# Patient Record
Sex: MALE | Race: WHITE | NOT HISPANIC OR LATINO
[De-identification: names, ages, dates, MRNs, and addresses within clinical notes are randomized per-mention and may not be internally consistent; named-entity substitution may affect disease eponyms.]

---

## 2023-08-01 ENCOUNTER — NON-APPOINTMENT (OUTPATIENT)
Age: 43
End: 2023-08-01

## 2023-08-01 ENCOUNTER — APPOINTMENT (OUTPATIENT)
Dept: PAIN MANAGEMENT | Facility: CLINIC | Age: 43
End: 2023-08-01

## 2023-08-01 PROBLEM — Z00.00 ENCOUNTER FOR PREVENTIVE HEALTH EXAMINATION: Status: ACTIVE | Noted: 2023-08-01

## 2023-08-18 ENCOUNTER — APPOINTMENT (OUTPATIENT)
Dept: PAIN MANAGEMENT | Facility: CLINIC | Age: 43
End: 2023-08-18
Payer: COMMERCIAL

## 2023-08-18 VITALS
HEART RATE: 95 BPM | HEIGHT: 68 IN | SYSTOLIC BLOOD PRESSURE: 145 MMHG | DIASTOLIC BLOOD PRESSURE: 86 MMHG | OXYGEN SATURATION: 98 % | WEIGHT: 173 LBS | BODY MASS INDEX: 26.22 KG/M2

## 2023-08-18 PROCEDURE — 99203 OFFICE O/P NEW LOW 30 MIN: CPT

## 2023-08-18 RX ORDER — BUPROPION HCL 100 MG
100 TABLET,SUSTAINED-RELEASE 12 HR ORAL
Refills: 0 | Status: ACTIVE | COMMUNITY

## 2023-08-18 NOTE — PHYSICAL EXAM
[de-identified] : General Appearance: Level of consciousness: Alert Body habitus: Well-nourished Signs of distress: Some noticeable discomfort. Hygiene: Clean  Psychiatric: ++ Pressured speech, ++ Restless, + Cooperative.  Skin: Multiple Tattoo's  Nailbeds pink with no cyanosis or clubbing. Lesions or rashes: None observed  Head and Neck: The head is normocephalic and atraumatic Eyes: Conjunctivae  clear without exudates. Sclera is non-icteric Ears: No discharge. Hearing is intact with good acuity Nose: No discharge. No nasal flaring Mouth and throat: Trachea Midline, teeth and gums are without obvious lesion  Respiratory: Breathing pattern: Normal Chest movement: Symmetrical Use of accessory muscles: Absent Cough: Absent Wheezing: Absent  Cardiovascular System: Pulse: Regular Cyanosis: Absent  Abdomen: Inspection: No distention Visible pulsations: Absent  Musculoskeletal System: Muscle strength:   strength is normal bilaterally. Gait: Steady, NO assistive device Posture: Upright Rises from a seated position with no significant difficulty    Spine: Curvature of the cervical, thoracic, and lumbar spine are within normal limits. Spinous processes are midline. NO tenderness of spinous processes. LEFT SCM hypertonic Paraspinal musculature is NOT hypertonic NO discomfort is noted with flexion MILD discomfort is noted with extension (cervical spine) No discomfort is noted with side-to-side rotation  Straight leg raise test is negative bilaterally.  Joint examination: No Swelling, No gross deformities.   Neurological System: Cranial nerves 2-12: Grossly intact II-Vision grossly intact, PERRLA, III/IV/VI- EOMI, VII-hearing grossly normal, V-clenches jaw, VII- Raises eyebrows bilaterally; Smile/frown intact and symmetric, capable of barring teeth / puff cheeks, XII- Able to protrude and move tongue side to side, IX/X- swallowing intact; capable of saying Ah Motor function: Moving all extremities against gravity Dorsi/plantar flexion is normal bilaterally. Sensation: No gross deficit to light touch

## 2023-08-18 NOTE — HISTORY OF PRESENT ILLNESS
[Back Pain] : back pain [Neck Pain] : neck pain [___ yrs] : [unfilled] year(s) ago [Constant] : constant [3] : a current pain level of 3/10 [5] : a minimum pain level of 5/10 [10] : a maximum pain level of 10/10 [Dull] : dull [Aching] : aching [Burning] : burning [Bending] : bending [Lifting] : lifting [Turning Head] : turning head [Looking Up] : looking up [Looking Down] : looking down [Laying] : laying [Heat] : heat [Ice] : ice [FreeTextEntry1] : HPI - Mr. MILADYS ROBERTS is a 42 year M with PHx as below, referred by SELF who presents today with chief complaint of neck and right shoulder pain. Reports that it developed many years ago. . It is located cervical spine there is radiation of the pain into the RIGHT shoulder.  The pain is presently 5/10 in severity on the numerical rating scale. It is sharp in nature. The pain is constant. There is diurnal worsening, during the night. The pain is aggravated with overhead activity. The pain improves with rest. The pain is functionally and emotionally disabling for the patient as its preventing them from going about activities of daily living, such as routine housework. The pain does impair the patients ability to sleep.   Patient was NOT  been seen by pain management in the past. Patient attests to  6 weeks of provider directed treatment, including a provider directed stretching regimen, chiropractor Patient reports treatment that occurred within the last 3 months. Patient report that symptoms have been persistent and and progressing after treatment   Reports there is NO present numbness, there is NO weakness. Patient denies any bowel/bladder incontinence, no saddle/perineal anesthesia or any other red flag signs or symptoms. Reports regular BMs.    [FreeTextEntry7] : Patient presents with mid back pain and neck pain.  [FreeTextEntry4] : PT, Acupuncture, , Chiropractor

## 2023-08-18 NOTE — ASSESSMENT
[FreeTextEntry1] : Mr. MILADYS ROBERTS is a 42 year M suffering from neck pain, that based upon today's subjective complaints, physical examination, and imaging review, is likely multifactorial with component of cervical spondylosis as well as myofascial element.  >> Medications  Given the patient's significant anxiety during examination, this condition does not seem to be well controlled. Opiates do not have strong indication in patients with underlying psychiatric disease, especially when psychiatric pathology is active. This condition is known to be associated with increased risk of opiate related aberrant behavior. For these reasons, patient is not a good candidate for this class of medications, and I will not provide.   C/w Cyclobenzarine 10 mg po qhs prn  >> Interventions   None indicated at this time   >> Therapy and Other Modalities   diagnosis: cervical spondylosis  periscapular and scapulothoracic stretching and strengthening teach home exercise regimen massage and myofascial release increase ROM, strengthening, postural training, other modalities ad eugene physical therapy - 2x weekly / 6 weeks Precautions - universal safety, falls   >> Imaging and Other Studies  I have personally reviewed the images in detail together with the patient today, and I have answered all questions regarding this condition to the best of my ability, to the patient's satisfaction.   >> Consults  None ordered

## 2023-08-18 NOTE — REVIEW OF SYSTEMS
[Back Pain] : back pain [Neck Pain] : neck pain [Joint Pain] : joint pain [Joint Stiffness] : joint stiffness [Decreased ROM] : decreased range of motion [Weakness] : weakness [Negative] : Heme/Lymph

## 2023-08-18 NOTE — DATA REVIEWED
[FreeTextEntry1] :   Order#: [id number]; [id number]    CLINICAL HISTORY: Chronic pain.  Cervical Spine.  Frontal, lateral, right and left oblique and open mouth projections demonstrate satisfactory alignment of the bony structures. No fracture or vertebral compression can be appreciated. No prevertebral soft tissue swelling or abnormal splaying of the spinous processes is noted. There is disc space narrowing and associated hypertrophic spurring at the C5-6 and C6-7 levels. The exiting neural foramen are suboptimally profiled, however, no gross foraminal encroachment can be appreciated. No destructive bony lesion is noted.   IMPRESSION: Degenerative disc disease and spondylosis deformans at the C5-6 and C6-7 levels.    Thoracic Spine  Frontal and lateral projections of thoracic spine demonstrate satisfactory alignment of the bony structures. No fractures or vertebral compressions can be appreciated. There is no evidence of bony destruction. There is mild degenerative spurring at the T8-9 and T9-10 levels are  IMPRESSION: Mild degenerative spurring at the T8-9 and T9-10 levels.  --- End of Report ---  ***Electronically Signed *** ----------------------------------------------- Kaden Trent MD 09/16/22 1703  Dictated on 09/16/22   Report cc: JO ANN VALENZUELA DC;   Exam: XR SHOULDER RIGHT (COMMON) Order#: [id number]    INDICATION: Right shoulder pain  TECHNIQUE: 3 views of the right shoulder  COMPARISON: None available  FINDINGS:  There is no fracture or dislocation. The joint spaces and alignment are preserved. There is no focal soft tissue abnormality.    IMPRESSION:  No significant joint space narrowing.  --- End of Report ---  ***Electronically Signed *** ----------------------------------------------- CARSON CHAN MD 06/08/22 1722  Dictated on 06/08/22   Report cc: Jf Smith MD;   --  iSTOP CHecked  [id number]  Practitioner Count: 1 Pharmacy Count: 1 Current Opioid Prescriptions: 0 Current Benzodiazepine Prescriptions: 0 Current Stimulant Prescriptions: 1   Patient Demographic Information (PDI)     PDI	First Name	Last Name	Birth Date	Gender	Street Address	[facility] [place]	1980	Male	[address]	[id number]  Prescription Information    PDI Filter:   PDI	My Rx	Current Rx	Drug Type	Rx Written	Rx Dispensed	Drug	Quantity	Days Supply	Prescriber Name	Prescriber MARIANO #	Payment Method	Dispenser A	N	Y	S	08/01/2023	08/01/2023	dextroamp-amphetamin 20 mg tab	45	30	Jf Smith MD	[id number]	[facility] #[id number] A	N	N	S	06/29/2023	06/30/2023	dextroamp-amphetamin 20 mg tab	45	30	Jf Smith MD	[id number]	Insurance	[facility] #[id number] A	N	N	S	05/30/2023	05/31/2023	dextroamp-amphetamin 20 mg tab	45	30	Jf Smith MD	[id number]	Insurance	[facility] #[id number] A	N	N	S	05/01/2023	05/02/2023	dextroamp-amphetamin 20 mg tab	45	30	Jf Smith MD	[id number]	Insurance	[facility] #[id number] A	N	N	S	03/30/2023	03/30/2023	dextroamp-amphetamin 20 mg tab	45	30	Jf Smith MD	[id number]	Insurance	[facility] #[id number] A	N	N	O	03/09/2023	03/09/2023	oxycodone hcl (ir) 5 mg tablet	30	5	Jf Smith MD	[id number]	[facility] #[id number] A	N	N	S	02/23/2023	02/24/2023	dextroamp-amphetamin 20 mg tab	45	30	Jf Smith MD	[id number]	[facility] #[id number] A	N	N	S	01/20/2023	01/21/2023	dextroamp-amphetamin 20 mg tab	45	30	Lizzie Stephenson [facility]	[id number]	Insurance	[facility] #[id number] A	N	N	S	12/19/2022	12/20/2022	dextroamp-amphetamin 20 mg tab	45	30	Jf Smith MD	[id number]	Insurance	[facility] #[id number] A	N	N	O	12/06/2022	12/07/2022	oxycodone hcl (ir) 5 mg tablet	30	5	Jf Smith MD	[id number]	[place]	[facility] #[id number] A	N	N	O	11/29/2022	11/29/2022	oxycodone hcl (ir) 5 mg tablet	30	5	Jf Smith MD	[id number]	Insurance	[facility] #[id number] A	N	N	S	11/09/2022	11/12/2022	dextroamp-amphetamin 20 mg tab	45	30	Lizzie Stephenson [facility]	[id number]	Insurance	[facility] #[id number] A	N	N	S	10/06/2022	10/07/2022	dextroamp-amphetamin 20 mg tab	45	30	Jf Smith MD	[id number]	Insurance	[facility] #[id number] A	N	N	S	09/06/2022	09/06/2022	dextroamp-amphetamin 20 mg tab	45	30	Jf Smith MD	[id number]	Insurance	[facility] #[id number]

## 2024-04-03 ENCOUNTER — APPOINTMENT (OUTPATIENT)
Dept: NEUROSURGERY | Facility: CLINIC | Age: 44
End: 2024-04-03

## 2024-05-24 ENCOUNTER — NON-APPOINTMENT (OUTPATIENT)
Age: 44
End: 2024-05-24

## 2024-05-24 ENCOUNTER — APPOINTMENT (OUTPATIENT)
Dept: PAIN MANAGEMENT | Facility: CLINIC | Age: 44
End: 2024-05-24
Payer: COMMERCIAL

## 2024-05-24 VITALS
WEIGHT: 178 LBS | BODY MASS INDEX: 26.98 KG/M2 | HEIGHT: 68 IN | DIASTOLIC BLOOD PRESSURE: 91 MMHG | HEART RATE: 86 BPM | SYSTOLIC BLOOD PRESSURE: 151 MMHG | OXYGEN SATURATION: 98 %

## 2024-05-24 DIAGNOSIS — M54.12 RADICULOPATHY, CERVICAL REGION: ICD-10-CM

## 2024-05-24 DIAGNOSIS — M47.812 SPONDYLOSIS W/OUT MYELOPATHY OR RADICULOPATHY, CERVICAL REGION: ICD-10-CM

## 2024-05-24 DIAGNOSIS — Z87.39 PERSONAL HISTORY OF OTHER DISEASES OF THE MUSCULOSKELETAL SYSTEM AND CONNECTIVE TISSUE: ICD-10-CM

## 2024-05-24 DIAGNOSIS — G89.4 CHRONIC PAIN SYNDROME: ICD-10-CM

## 2024-05-24 DIAGNOSIS — M79.10 MYALGIA, UNSPECIFIED SITE: ICD-10-CM

## 2024-05-24 PROCEDURE — 99214 OFFICE O/P EST MOD 30 MIN: CPT

## 2024-05-24 RX ORDER — DEXTROAMPHETAMINE SACCHARATE, AMPHETAMINE ASPARTATE, DEXTROAMPHETAMINE SULFATE, AND AMPHETAMINE SULFATE 7.5; 7.5; 7.5; 7.5 MG/1; MG/1; MG/1; MG/1
30 TABLET ORAL
Refills: 0 | Status: ACTIVE | COMMUNITY

## 2024-05-24 NOTE — ASSESSMENT
[FreeTextEntry1] : 43 yom w/ severe neck and arm pain.  I have personally reviewed the patient's MRI in detail and discussed it with them which is significant for severe bilateral foraminal narrowing in the cervical spine.    The patient has failed to have relief with medication management and physical therapy. Given the patients failure to improve with all other conservative measures, recommend C7-T1 interlaminar epidural steroid injection under fluoroscopic guidance. The patient will follow-up with me in my office two weeks following intervention.  I have discussed in detail with the patient that an interventional spine procedure is associated with potential risks. The procedure may include an injection of steroid and potentially other medications (local anesthetic and normal saline) into the epidural space or surrounding tissue of the spine. There are significant risks of this procedure which include and are not limited to infection, bleeding, worsening pain, dural puncture leading to post-dural puncture headache, nerve damage, spinal cord injury, paralysis, stroke, and death. There is a chance that the procedure does not improve their pain. There are risks associated with the steroid being absorbed into the body systemically. These include dysphoria, difficulty sleeping, mood swings, and personality changes. Pre-menopausal women may notice a regularity his in her menstrual cycle for 2-3 months following the injection. Steroids can specifically affect patients with hypertension, diabetes, and peptic ulcers. The procedure may cause a temporary increase in blood pressure and blood glucose, and may adversely affect a peptic ulcer. Other, more rare complications, including avascular necrosis of the joints, glaucoma, and osteoporosis. I have discussed the risks of the procedure at length with the patient, and the potential benefits of pain relief. I have offered alternatives to the procedure. All questions were answered. The patient expressed understanding and wishes to proceed with the procedure.  Physical therapy prescribed - goal will be to increase ROM, strengthening, postural training, other modalities ad eugene which may include massage and stim. Goals of therapy discussed with the patient in detail and will be discussed with physical therapist. Patient will follow-up following course of physical therapy to monitor progress and adjust therapy as needed.  Acetaminophen 1,000 mg q8h prn for moderate pain. Risks, benefits, and alternatives of acetaminophen discussed with patient.  Ibuprofen 600 mg q8h prn add when pain is not adequately controlled with acetaminophen. Risks, benefits, and alternatives of ibuprofen discussed with patient.  Diet and nutritional strategies discussed which may improve patients pain and will improve overall health.

## 2024-05-24 NOTE — DATA REVIEWED
[FreeTextEntry1] : MRI Cervical Spine:  severe bilateral foraminal narrowing.   Xray: CLINICAL HISTORY: Chronic pain.  Cervical Spine.  Frontal, lateral, right and left oblique and open mouth projections demonstrate satisfactory alignment of the bony structures. No fracture or vertebral compression can be appreciated. No prevertebral soft tissue swelling or abnormal splaying of the spinous processes is noted. There is disc space narrowing and associated hypertrophic spurring at the C5-6 and C6-7 levels. The exiting neural foramen are suboptimally profiled, however, no gross foraminal encroachment can be appreciated. No destructive bony lesion is noted.   IMPRESSION: Degenerative disc disease and spondylosis deformans at the C5-6 and C6-7 levels.    Thoracic Spine  Frontal and lateral projections of thoracic spine demonstrate satisfactory alignment of the bony structures. No fractures or vertebral compressions can be appreciated. There is no evidence of bony destruction. There is mild degenerative spurring at the T8-9 and T9-10 levels are  IMPRESSION: Mild degenerative spurring at the T8-9 and T9-10 levels.  --- End of Report ---  ***Electronically Signed *** ----------------------------------------------- Kaden Trent MD 09/16/22 1703  Dictated on 09/16/22   Report cc: JO ANN VALENZUELA DC;   Exam: XR SHOULDER RIGHT (COMMON) Order#: [id number]    INDICATION: Right shoulder pain  TECHNIQUE: 3 views of the right shoulder  COMPARISON: None available  FINDINGS:  There is no fracture or dislocation. The joint spaces and alignment are preserved. There is no focal soft tissue abnormality.    IMPRESSION:  No significant joint space narrowing.  --- End of Report ---  ***Electronically Signed *** ----------------------------------------------- CARSON CHAN MD 06/08/22 1722  Dictated on 06/08/22   Report cc: Jf Smith MD;   --  iSTOP CHecked  [id number]  Practitioner Count: 1 Pharmacy Count: 1 Current Opioid Prescriptions: 0 Current Benzodiazepine Prescriptions: 0 Current Stimulant Prescriptions: 1   Patient Demographic Information (PDI)     PDI	First Name	Last Name	Birth Date	Gender	Street Address	[facility] [place]	1980	Male	[address] [facility]	[address]  Prescription Information    PDI Filter:   PDI	My Rx	Current Rx	Drug Type	Rx Written	Rx Dispensed	Drug	Quantity	Days Supply	Prescriber Name	Prescriber MARIANO #	Payment Method	Dispenser A	N	Y	S	08/01/2023	08/01/2023	dextroamp-amphetamin 20 mg tab	45	30	Jf Smith MD	[id number]	[facility] #[id number] A	N	N	S	06/29/2023	06/30/2023	dextroamp-amphetamin 20 mg tab	45	30	Jf Smith MD	[id number]	Insurance	[facility] #[id number] A	N	N	S	05/30/2023	05/31/2023	dextroamp-amphetamin 20 mg tab	45	30	Jf Smith MD	[id number]	Insurance	[facility] #[id number] A	N	N	S	05/01/2023	05/02/2023	dextroamp-amphetamin 20 mg tab	45	30	Jf Smith MD	[id number]	Insurance	[facility] #[id number] A	N	N	S	03/30/2023	03/30/2023	dextroamp-amphetamin 20 mg tab	45	30	Jf Smith MD	[id number]	Insurance	[facility] #[id number] A	N	N	O	03/09/2023	03/09/2023	oxycodone hcl (ir) 5 mg tablet	30	5	Jf Smith MD	[id number]	[facility] #[id number] A	N	N	S	02/23/2023	02/24/2023	dextroamp-amphetamin 20 mg tab	45	30	Jf Smith MD	[id number]	[facility] #[id number] A	N	N	S	01/20/2023	01/21/2023	dextroamp-amphetamin 20 mg tab	45	30	L[facility]ie Stephenson [facility]	[id number]	Insurance	[facility] #[id number] A	N	N	S	12/19/2022	12/20/2022	dextroamp-amphetamin 20 mg tab	45	30	Jf Smith MD	[id number]	Insurance	[facility] #[id number] A	N	N	O	12/06/2022	12/07/2022	oxycodone hcl (ir) 5 mg tablet	30	5	Jf Smith MD	[id number]	[place]	[facility] #[id number] A	N	N	O	11/29/2022	11/29/2022	oxycodone hcl (ir) 5 mg tablet	30	5	Jf Smith MD	[id number]	Insurance	[facility] #[id number] A	N	N	S	11/09/2022	11/12/2022	dextroamp-amphetamin 20 mg tab	45	30	Lizzie Stephenson [facility]	[id number]	Insurance	[facility] #[id number] A	N	N	S	10/06/2022	10/07/2022	dextroamp-amphetamin 20 mg tab	45	30	Jf Smith MD	[id number]	Insurance	[facility] #[id number] A	N	N	S	09/06/2022	09/06/2022	dextroamp-amphetamin 20 mg tab	45	30	Jf Smith MD	[id number]	Insurance	[facility] #[id number]

## 2024-05-24 NOTE — HISTORY OF PRESENT ILLNESS
[Back Pain] : back pain [Neck Pain] : neck pain [___ yrs] : [unfilled] year(s) ago [Constant] : constant [3] : a current pain level of 3/10 [5] : a minimum pain level of 5/10 [10] : a maximum pain level of 10/10 [Dull] : dull [Aching] : aching [Burning] : burning [Bending] : bending [Lifting] : lifting [Turning Head] : turning head [Looking Up] : looking up [Looking Down] : looking down [Laying] : laying [Heat] : heat [Ice] : ice [FreeTextEntry1] : Interval History: Pt was last seen by Dr. Galloway on August 18, 2023. He has since seen Dr. Trev Cohen. He is a former . 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 After doing this for a year he began to develop pain in his upper right shoulder and upper back. He has used Willis technique, BARRIE, and manual trigger points. Quality of life is impaired. There has been a severe exacerbation of the patient's chronic pain.  HPI - Mr. MILADYS ROBERTS is a 42 year M with PHx as below, referred by SELF who presents today with chief complaint of neck and right shoulder pain. Reports that it developed many years ago. . It is located cervical spine there is radiation of the pain into the RIGHT shoulder.  The pain is presently 5/10 in severity on the numerical rating scale. It is sharp in nature. The pain is constant. There is diurnal worsening, during the night. The pain is aggravated with overhead activity. The pain improves with rest. The pain is functionally and emotionally disabling for the patient as its preventing them from going about activities of daily living, such as routine housework. The pain does impair the patients ability to sleep.   Patient was NOT  been seen by pain management in the past. Patient attests to 6 weeks of provider directed treatment, including a provider directed stretching regimen, chiropractor Patient reports treatment that occurred within the last 3 months. Patient report that symptoms have been persistent and and progressing after treatment   Reports there is NO present numbness, there is NO weakness. Patient denies any bowel/bladder incontinence, no saddle/perineal anesthesia or any other red flag signs or symptoms. Reports regular BMs.    [FreeTextEntry7] : Patient presents with mid back pain and neck pain.  [FreeTextEntry4] : PT, Acupuncture, , Chiropractor

## 2024-05-24 NOTE — PHYSICAL EXAM
[de-identified] : General Appearance: Level of consciousness: Alert Body habitus: Well-nourished Signs of distress: Some noticeable discomfort. Hygiene: Clean  Psychiatric: ++ Pressured speech, ++ Restless, + Cooperative.  Skin: Multiple Tattoo's  Nailbeds pink with no cyanosis or clubbing. Lesions or rashes: None observed  Head and Neck: The head is normocephalic and atraumatic Eyes: Conjunctivae  clear without exudates. Sclera is non-icteric Ears: No discharge. Hearing is intact with good acuity Nose: No discharge. No nasal flaring Mouth and throat: Trachea Midline, teeth and gums are without obvious lesion  Respiratory: Breathing pattern: Normal Chest movement: Symmetrical Use of accessory muscles: Absent Cough: Absent Wheezing: Absent  Cardiovascular System: Pulse: Regular Cyanosis: Absent  Abdomen: Inspection: No distention Visible pulsations: Absent  Musculoskeletal System: Muscle strength:   strength is normal bilaterally. Gait: Steady, NO assistive device Posture: Upright Rises from a seated position with no significant difficulty    Spine: Curvature of the cervical, thoracic, and lumbar spine are within normal limits. Spinous processes are midline. NO tenderness of spinous processes. LEFT SCM hypertonic Paraspinal musculature is NOT hypertonic NO discomfort is noted with flexion MILD discomfort is noted with extension (cervical spine) No discomfort is noted with side-to-side rotation  Straight leg raise test is negative bilaterally.  Joint examination: No Swelling, No gross deformities.   Neurological System: Cranial nerves 2-12: Grossly intact II-Vision grossly intact, PERRLA, III/IV/VI- EOMI, VII-hearing grossly normal, V-clenches jaw, VII- Raises eyebrows bilaterally; Smile/frown intact and symmetric, capable of barring teeth / puff cheeks, XII- Able to protrude and move tongue side to side, IX/X- swallowing intact; capable of saying Ah Motor function: Moving all extremities against gravity Dorsi/plantar flexion is normal bilaterally. Sensation: No gross deficit to light touch

## 2024-06-18 ENCOUNTER — TRANSCRIPTION ENCOUNTER (OUTPATIENT)
Age: 44
End: 2024-06-18

## 2024-06-18 ENCOUNTER — APPOINTMENT (OUTPATIENT)
Dept: PAIN MANAGEMENT | Facility: HOSPITAL | Age: 44
End: 2024-06-18

## 2024-07-09 ENCOUNTER — APPOINTMENT (OUTPATIENT)
Dept: PAIN MANAGEMENT | Facility: CLINIC | Age: 44
End: 2024-07-09
Payer: COMMERCIAL

## 2024-07-09 VITALS
HEART RATE: 104 BPM | SYSTOLIC BLOOD PRESSURE: 162 MMHG | OXYGEN SATURATION: 98 % | DIASTOLIC BLOOD PRESSURE: 90 MMHG | HEIGHT: 68 IN | WEIGHT: 185 LBS | BODY MASS INDEX: 28.04 KG/M2

## 2024-07-09 DIAGNOSIS — G89.4 CHRONIC PAIN SYNDROME: ICD-10-CM

## 2024-07-09 DIAGNOSIS — M79.10 MYALGIA, UNSPECIFIED SITE: ICD-10-CM

## 2024-07-09 DIAGNOSIS — M47.812 SPONDYLOSIS W/OUT MYELOPATHY OR RADICULOPATHY, CERVICAL REGION: ICD-10-CM

## 2024-07-09 DIAGNOSIS — M54.12 RADICULOPATHY, CERVICAL REGION: ICD-10-CM

## 2024-07-09 PROCEDURE — 99214 OFFICE O/P EST MOD 30 MIN: CPT

## 2024-08-20 ENCOUNTER — APPOINTMENT (OUTPATIENT)
Dept: PAIN MANAGEMENT | Facility: HOSPITAL | Age: 44
End: 2024-08-20

## 2024-08-20 ENCOUNTER — TRANSCRIPTION ENCOUNTER (OUTPATIENT)
Age: 44
End: 2024-08-20

## 2025-01-09 ENCOUNTER — APPOINTMENT (OUTPATIENT)
Dept: PAIN MANAGEMENT | Facility: CLINIC | Age: 45
End: 2025-01-09
Payer: COMMERCIAL

## 2025-01-09 VITALS
HEIGHT: 68 IN | SYSTOLIC BLOOD PRESSURE: 137 MMHG | BODY MASS INDEX: 28.04 KG/M2 | DIASTOLIC BLOOD PRESSURE: 98 MMHG | WEIGHT: 185 LBS

## 2025-01-09 DIAGNOSIS — G89.4 CHRONIC PAIN SYNDROME: ICD-10-CM

## 2025-01-09 DIAGNOSIS — M47.812 SPONDYLOSIS W/OUT MYELOPATHY OR RADICULOPATHY, CERVICAL REGION: ICD-10-CM

## 2025-01-09 DIAGNOSIS — M47.817 SPONDYLOSIS W/OUT MYELOPATHY OR RADICULOPATHY, LUMBOSACRAL REGION: ICD-10-CM

## 2025-01-09 DIAGNOSIS — M54.12 RADICULOPATHY, CERVICAL REGION: ICD-10-CM

## 2025-01-09 DIAGNOSIS — M79.10 MYALGIA, UNSPECIFIED SITE: ICD-10-CM

## 2025-01-09 DIAGNOSIS — M54.9 DORSALGIA, UNSPECIFIED: ICD-10-CM

## 2025-01-09 PROCEDURE — 99214 OFFICE O/P EST MOD 30 MIN: CPT

## 2025-01-09 PROCEDURE — G2211 COMPLEX E/M VISIT ADD ON: CPT | Mod: NC
